# Patient Record
Sex: MALE | URBAN - METROPOLITAN AREA
[De-identification: names, ages, dates, MRNs, and addresses within clinical notes are randomized per-mention and may not be internally consistent; named-entity substitution may affect disease eponyms.]

---

## 2017-01-05 ENCOUNTER — IMPORTED ENCOUNTER (OUTPATIENT)
Dept: URBAN - METROPOLITAN AREA CLINIC 1 | Facility: CLINIC | Age: 62
End: 2017-01-05

## 2017-01-05 PROBLEM — H25.813: Noted: 2017-01-05

## 2017-01-05 PROBLEM — H01.004: Noted: 2017-01-05

## 2017-01-05 PROBLEM — H01.001: Noted: 2017-01-05

## 2017-01-05 PROCEDURE — 92015 DETERMINE REFRACTIVE STATE: CPT

## 2017-01-05 PROCEDURE — 92014 COMPRE OPH EXAM EST PT 1/>: CPT

## 2017-01-05 NOTE — PATIENT DISCUSSION
1.  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 Discussed the risks benefits alternatives and limitations of cataract surgery. The patient stated a full understanding and a desire to proceed with the procedure. The patient will need to return for preop appointment with cataract measurements and to have any additional questions answered and start pre-operative eye drops as directed. Phaco PCL OD then OS patient works at [employer] will likely desire Torics. OS is dominant eyeOtherwise follow-up2. Blepharitis anterior type OU - Daily warm compresses and lid scrubs were recommended. Return for an appointment for Ascan/H and P. with Dr. Bacilio Archuleta.

## 2017-03-06 ENCOUNTER — IMPORTED ENCOUNTER (OUTPATIENT)
Dept: URBAN - METROPOLITAN AREA CLINIC 1 | Facility: CLINIC | Age: 62
End: 2017-03-06

## 2017-03-06 PROBLEM — H25.813: Noted: 2017-03-06

## 2017-03-06 PROCEDURE — 92136 OPHTHALMIC BIOMETRY: CPT

## 2017-03-06 NOTE — PATIENT DISCUSSION
1. 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 The patient stated a full understanding and a desire to proceed with the procedure. 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 Pt understands they will need glasses post-op for near vision. Discussed with patient patient understood. Phaco PCL OD then OS. Toric lens LenSx.

## 2017-03-15 ENCOUNTER — IMPORTED ENCOUNTER (OUTPATIENT)
Dept: URBAN - METROPOLITAN AREA CLINIC 1 | Facility: CLINIC | Age: 62
End: 2017-03-15

## 2017-03-16 ENCOUNTER — IMPORTED ENCOUNTER (OUTPATIENT)
Dept: URBAN - METROPOLITAN AREA CLINIC 1 | Facility: CLINIC | Age: 62
End: 2017-03-16

## 2017-03-16 PROBLEM — Z96.1: Noted: 2017-03-16

## 2017-03-16 PROCEDURE — 99024 POSTOP FOLLOW-UP VISIT: CPT

## 2017-03-16 NOTE — PATIENT DISCUSSION
POD#1 CE/IOL OD (Toric w/ LenSx) doing well. Continue all 3 gtts as prescribed and until gone. Ocuflox TID ODDurezol BID ODAcular Ruth Ann Lopez ODPost op Warnings Reiterated RTC as scheduled Note for work given ok per [facility]

## 2017-03-21 ENCOUNTER — IMPORTED ENCOUNTER (OUTPATIENT)
Dept: URBAN - METROPOLITAN AREA CLINIC 1 | Facility: CLINIC | Age: 62
End: 2017-03-21

## 2017-03-21 PROBLEM — H25.812: Noted: 2017-03-21

## 2017-03-21 PROBLEM — Z96.1: Noted: 2017-03-21

## 2017-03-21 PROCEDURE — 92136 OPHTHALMIC BIOMETRY: CPT

## 2017-03-21 NOTE — PATIENT DISCUSSION
1.  Cataract OS: Visually Significant secondary to glare discussed the risks benefits alternatives and limitations of cataract surgery. The patient stated a full understanding and a desire to proceed with the procedure. The patient will need to start pre-operative eye drops as directed. Proceed w/ phaco PCL OS2. POW#1  CE/IOL Toric w/ Lensx OD doing well. Discontinue OcufloxContinue Lotemax/Durezol/Prednisolone BID until gone. Continue Prolensa/Ilevro/Acular QD until gone. 3. Return for an appointment for sx OS as scheduled with Dr. Radha Trejo.

## 2017-04-19 ENCOUNTER — IMPORTED ENCOUNTER (OUTPATIENT)
Dept: URBAN - METROPOLITAN AREA CLINIC 1 | Facility: CLINIC | Age: 62
End: 2017-04-19

## 2017-04-20 ENCOUNTER — IMPORTED ENCOUNTER (OUTPATIENT)
Dept: URBAN - METROPOLITAN AREA CLINIC 1 | Facility: CLINIC | Age: 62
End: 2017-04-20

## 2017-04-20 PROBLEM — Z96.1: Noted: 2017-04-20

## 2017-04-20 PROCEDURE — 99024 POSTOP FOLLOW-UP VISIT: CPT

## 2017-04-20 NOTE — PATIENT DISCUSSION
1. POD#1 CE/IOL OS (Toric) doing well. Continue all 3 gtts as prescribed and until gone. Use Durezol BID OS Ilevro Qdaily OS Ocuflox TID OS Use Combigan BID OS (Sample given and 1 gtt applied OS prior to leaving) 2. POW#4  CE/IOL OD (Toric w/ LenSx)  doing well.   Use Durezol BID OD till out Use Ilevro Qdaily OD till out F/u as scheduled

## 2017-05-15 ENCOUNTER — IMPORTED ENCOUNTER (OUTPATIENT)
Dept: URBAN - METROPOLITAN AREA CLINIC 1 | Facility: CLINIC | Age: 62
End: 2017-05-15

## 2017-05-15 PROBLEM — Z96.1: Noted: 2017-05-15

## 2017-05-15 PROCEDURE — 99024 POSTOP FOLLOW-UP VISIT: CPT

## 2017-05-15 NOTE — PATIENT DISCUSSION
POW#3 Phaco/ PCL OU (Toric OU H/o LenSx OD) Doing well Finish PO meds per schedule MRX for glasses given Could consider YAG Cap PRN Return for an appointment in Jan 30 with Dr. Robb Bray.

## 2018-02-19 ENCOUNTER — IMPORTED ENCOUNTER (OUTPATIENT)
Dept: URBAN - METROPOLITAN AREA CLINIC 1 | Facility: CLINIC | Age: 63
End: 2018-02-19

## 2018-02-19 PROBLEM — H01.002: Noted: 2018-02-19

## 2018-02-19 PROBLEM — H01.005: Noted: 2018-02-19

## 2018-02-19 PROBLEM — Z96.1: Noted: 2018-02-19

## 2018-02-19 PROCEDURE — 92014 COMPRE OPH EXAM EST PT 1/>: CPT

## 2018-02-19 NOTE — PATIENT DISCUSSION
1.  Blepharitis anterior type OU- Controlled. Continue daily warm compresses and lid scrubs were recommended. 2. Pseudophakia OU (Torics OU/ LenSx OD only)- Doing well. 3. Return for an appointment for a 27 in 1 year with Dr. Jorge Wolff.

## 2018-06-18 ENCOUNTER — IMPORTED ENCOUNTER (OUTPATIENT)
Dept: URBAN - METROPOLITAN AREA CLINIC 1 | Facility: CLINIC | Age: 63
End: 2018-06-18

## 2018-06-18 PROBLEM — H01.002: Noted: 2018-06-18

## 2018-06-18 PROBLEM — D48.5: Noted: 2018-06-18

## 2018-06-18 PROBLEM — H01.005: Noted: 2018-06-18

## 2018-06-18 PROBLEM — Z96.1: Noted: 2018-06-18

## 2018-06-18 PROCEDURE — 92012 INTRM OPH EXAM EST PATIENT: CPT

## 2018-06-18 NOTE — PATIENT DISCUSSION
1.  RLL Mass of uncertain behavior- Condition discussed w/ pt. Recommend excisional biopsy with pathology pt desires to schedule. D/C ASA x 2 days prior to sx and ok to restart the day after sx. 2. Blepharitis anterior type OU- Controlled. Continue daily warm compresses and lid scrubs were recommended. 3. Pseudophakia OU (Torics OU/ LenSx OD only)- Doing well. 4. Return for an appointment for excision of Mass RLL w/ path in 1-3 weeks with Dr. Kirsten Watson.

## 2018-06-27 ENCOUNTER — IMPORTED ENCOUNTER (OUTPATIENT)
Dept: URBAN - METROPOLITAN AREA CLINIC 1 | Facility: CLINIC | Age: 63
End: 2018-06-27

## 2018-06-27 PROBLEM — D48.5: Noted: 2018-06-27

## 2018-06-27 PROCEDURE — 67840 REMOVE EYELID LESION: CPT

## 2018-06-27 NOTE — PATIENT DISCUSSION
Excisional biopsy of mass on right lower eyelid: After proper informed consent was obtained the patient was taken to the operating room and placed supine on the operating table. The right eye was then prepped in the standard surgical fashion. Attention was then turned to the right lower eyelid where one percent Xylocaine with Epinephrine was infiltrated under the lesion. A scalpel was then used to make an elliptical incision around the lesion and Joslyn scissors were used to excise the mass free. The mass was passed off the table as a specimen and sent for pathology. Cautery was used for hemostasis and ointment was applied. The wound was small and therefore left to heal by secondary intention. The patient tolerated the procedure well and there were no complications. The patient was instructed to use Maxitrol karlo BID RLL x 5 days and RTC as scheduled.

## 2019-02-22 ENCOUNTER — IMPORTED ENCOUNTER (OUTPATIENT)
Dept: URBAN - METROPOLITAN AREA CLINIC 1 | Facility: CLINIC | Age: 64
End: 2019-02-22

## 2019-02-22 PROBLEM — H01.002: Noted: 2019-02-22

## 2019-02-22 PROBLEM — H01.005: Noted: 2019-02-22

## 2019-02-22 PROBLEM — H01.004: Noted: 2019-02-22

## 2019-02-22 PROBLEM — H01.001: Noted: 2019-02-22

## 2019-02-22 PROBLEM — H04.123: Noted: 2019-02-22

## 2019-02-22 PROBLEM — Z96.1: Noted: 2019-02-22

## 2019-02-22 PROCEDURE — 92014 COMPRE OPH EXAM EST PT 1/>: CPT

## 2019-02-22 NOTE — PATIENT DISCUSSION
1.  Anterior Blepharitis OU - Daily Hot compresses and lid scrubs were recommended. 2. Dry Eyes OU -- Recommend continue the frequent use of OTC AT's BID-QID OU Routinely. 3.  Pseudophakia OU (Torics OU/ LenSx OD only) -- Doing well. 4.  S/p RLL Mass Excision -- Doing well. Patient defers the refraction at today's visit. Return for an appointment in 1 YR for a 30 OU with Dr. Lux Weaver.

## 2020-06-15 ENCOUNTER — IMPORTED ENCOUNTER (OUTPATIENT)
Dept: URBAN - METROPOLITAN AREA CLINIC 1 | Facility: CLINIC | Age: 65
End: 2020-06-15

## 2020-06-15 PROBLEM — Z96.1: Noted: 2020-06-15

## 2020-06-15 PROBLEM — H01.004: Noted: 2020-06-15

## 2020-06-15 PROBLEM — H01.001: Noted: 2020-06-15

## 2020-06-15 PROBLEM — H26.493: Noted: 2020-06-15

## 2020-06-15 PROBLEM — H16.143: Noted: 2020-06-15

## 2020-06-15 PROBLEM — H04.123: Noted: 2020-06-15

## 2020-06-15 PROCEDURE — 92015 DETERMINE REFRACTIVE STATE: CPT

## 2020-06-15 PROCEDURE — 92012 INTRM OPH EXAM EST PATIENT: CPT

## 2020-06-15 NOTE — PATIENT DISCUSSION
1.  OLIVIER w/ PEK OU -- Recommend continue the frequent use of OTC AT's BID-QID OU Routinely. 2.  PCO OU -- Visually Significant *secondary to glare*; schedule YAG Cap OD then OS. Pros cons risks and benefits. 3.  Pseudophakia OU (Torics OU/ LenSx OD only) -- Doing well. 4.  Anterior Blepharitis OU -- Daily Hot compresses and lid scrubs were recommended. 5. S/p RLL Mass Excision -- Doing well. Return for an appointment YAG cap OD then OS with Dr. Ines Acevedo.

## 2020-07-01 ENCOUNTER — IMPORTED ENCOUNTER (OUTPATIENT)
Dept: URBAN - METROPOLITAN AREA CLINIC 1 | Facility: CLINIC | Age: 65
End: 2020-07-01

## 2020-07-15 ENCOUNTER — IMPORTED ENCOUNTER (OUTPATIENT)
Dept: URBAN - METROPOLITAN AREA CLINIC 1 | Facility: CLINIC | Age: 65
End: 2020-07-15

## 2021-10-23 ENCOUNTER — IMPORTED ENCOUNTER (OUTPATIENT)
Dept: URBAN - METROPOLITAN AREA CLINIC 1 | Facility: CLINIC | Age: 66
End: 2021-10-23

## 2021-10-23 PROBLEM — H04.123: Noted: 2021-10-23

## 2021-10-23 PROBLEM — H16.143: Noted: 2021-10-23

## 2021-10-23 PROBLEM — H01.024: Noted: 2021-10-23

## 2021-10-23 PROBLEM — H01.004: Noted: 2021-10-23

## 2021-10-23 PROBLEM — H01.021: Noted: 2021-10-23

## 2021-10-23 PROBLEM — H01.001: Noted: 2021-10-23

## 2021-10-23 PROCEDURE — 99214 OFFICE O/P EST MOD 30 MIN: CPT

## 2021-10-23 NOTE — PATIENT DISCUSSION
1.  OLIVIER w/ PEK OU -- Recommend continue the frequent use of OTC AT's BID-QID OU Routinely. 2.  Pseudophakia OU (Torics OU/ LenSx OD only) -- Doing well. 3.  Anterior Blepharitis OU -- Daily Hot compresses and lid scrubs were recommended. 4. S/p RLL Mass Excision -- Doing well. 5.  K Scar OS -- Stable observe. Return for an appointment 2 years for 27 with Dr. Che Marquez.

## 2022-04-02 ASSESSMENT — TONOMETRY
OS_IOP_MMHG: 26
OD_IOP_MMHG: 14
OS_IOP_MMHG: 21
OS_IOP_MMHG: 17
OD_IOP_MMHG: 17
OS_IOP_MMHG: 14
OS_IOP_MMHG: 19
OD_IOP_MMHG: 17
OD_IOP_MMHG: 16
OS_IOP_MMHG: 15
OD_IOP_MMHG: 14
OS_IOP_MMHG: 16
OD_IOP_MMHG: 15
OS_IOP_MMHG: 16
OD_IOP_MMHG: 14
OD_IOP_MMHG: 14
OD_IOP_MMHG: 16
OD_IOP_MMHG: 19

## 2022-04-02 ASSESSMENT — VISUAL ACUITY
OS_SC: 20/20
OS_GLARE: 20/100
OS_GLARE: 20/40
OS_GLARE: 20/100
OS_CC: 20/20
OD_CC: 20/20
OD_CC: 20/20
OD_SC: 20/20
OS_CC: 20/25-1
OD_CC: 20/20
OD_CC: 20/20
OD_GLARE: 20/40
OS_SC: 20/20
OD_GLARE: 20/150
OS_CC: 20/20
OD_GLARE: 20/150
OD_CC: 20/20
OD_GLARE: 20/40
OD_SC: 20/20
OD_CC: 20/20-1
OD_CC: 20/20
OD_CC: 20/20
OD_SC: 20/20
OS_CC: 20/20
OS_GLARE: 20/40

## 2022-04-02 ASSESSMENT — KERATOMETRY
OS_K1POWER_DIOPTERS: 46.25
OD_K1POWER_DIOPTERS: 46.25
OS_K2POWER_DIOPTERS: 45.00
OS_AXISANGLE2_DEGREES: 092
OD_K2POWER_DIOPTERS: 45.00
OD_AXISANGLE2_DEGREES: 111
OS_AXISANGLE_DEGREES: 002
OD_AXISANGLE_DEGREES: 021

## 2025-07-14 ENCOUNTER — NEW PATIENT (OUTPATIENT)
Age: 70
End: 2025-07-14

## 2025-07-14 DIAGNOSIS — Z96.1: ICD-10-CM

## 2025-07-14 DIAGNOSIS — H01.024: ICD-10-CM

## 2025-07-14 DIAGNOSIS — H01.021: ICD-10-CM

## 2025-07-14 DIAGNOSIS — H04.123: ICD-10-CM

## 2025-07-14 DIAGNOSIS — H16.143: ICD-10-CM

## 2025-07-14 PROCEDURE — 99204 OFFICE O/P NEW MOD 45 MIN: CPT
